# Patient Record
Sex: MALE | Race: WHITE | NOT HISPANIC OR LATINO | URBAN - METROPOLITAN AREA
[De-identification: names, ages, dates, MRNs, and addresses within clinical notes are randomized per-mention and may not be internally consistent; named-entity substitution may affect disease eponyms.]

---

## 2022-07-14 ENCOUNTER — NEW PATIENT (OUTPATIENT)
Dept: URBAN - METROPOLITAN AREA CLINIC 14 | Facility: CLINIC | Age: 77
End: 2022-07-14

## 2022-07-14 VITALS — HEIGHT: 60 IN | SYSTOLIC BLOOD PRESSURE: 150 MMHG | DIASTOLIC BLOOD PRESSURE: 80 MMHG | HEART RATE: 75 BPM

## 2022-07-14 DIAGNOSIS — H25.13: ICD-10-CM

## 2022-07-14 PROCEDURE — 92136 OPHTHALMIC BIOMETRY: CPT

## 2022-07-14 PROCEDURE — 99204 OFFICE O/P NEW MOD 45 MIN: CPT

## 2022-07-14 PROCEDURE — 92015 DETERMINE REFRACTIVE STATE: CPT

## 2022-07-14 ASSESSMENT — TONOMETRY
OS_IOP_MMHG: 14
OD_IOP_MMHG: 13

## 2022-07-14 ASSESSMENT — VISUAL ACUITY
OS_SC: 20/100
OD_CC: 20/30
OS_BCVA: 20/70
OU_SC: 20/70
OU_CC: 20/30
OD_SC: 20/70
OD_BCVA: 20/30
OS_CC: 20/70

## 2022-07-14 ASSESSMENT — KERATOMETRY
OS_AXISANGLE2_DEGREES: 175
OD_K2POWER_DIOPTERS: 45.75
OD_AXISANGLE2_DEGREES: 12
OS_AXISANGLE_DEGREES: 85
OD_AXISANGLE_DEGREES: 102
OS_K2POWER_DIOPTERS: 46.00
OD_K1POWER_DIOPTERS: 44.25
OS_K1POWER_DIOPTERS: 44.50

## 2022-08-30 ENCOUNTER — CLINICAL TRIAL VISIT (OUTPATIENT)
Dept: URBAN - METROPOLITAN AREA CLINIC 14 | Facility: CLINIC | Age: 77
End: 2022-08-30

## 2022-08-30 DIAGNOSIS — H25.13: ICD-10-CM

## 2022-08-30 PROCEDURE — 99199CT CLINICAL TRIAL VISIT

## 2022-09-14 ENCOUNTER — POST-OP (OUTPATIENT)
Dept: URBAN - METROPOLITAN AREA CLINIC 14 | Facility: CLINIC | Age: 77
End: 2022-09-14

## 2022-09-14 DIAGNOSIS — H25.11: ICD-10-CM

## 2022-09-14 DIAGNOSIS — Z96.1: ICD-10-CM

## 2022-09-14 PROCEDURE — 99024 POSTOP FOLLOW-UP VISIT: CPT

## 2022-09-14 PROCEDURE — 92136 OPHTHALMIC BIOMETRY: CPT

## 2022-09-14 ASSESSMENT — KERATOMETRY
OS_AXISANGLE2_DEGREES: 175
OS_AXISANGLE_DEGREES: 85
OS_K2POWER_DIOPTERS: 46.00
OD_AXISANGLE_DEGREES: 102
OD_AXISANGLE2_DEGREES: 12
OS_K1POWER_DIOPTERS: 44.50
OD_K1POWER_DIOPTERS: 44.25
OD_K2POWER_DIOPTERS: 45.75

## 2022-09-14 ASSESSMENT — VISUAL ACUITY
OD_BCVA: 20/30
OS_SC: 20/30

## 2022-09-14 ASSESSMENT — TONOMETRY
OD_IOP_MMHG: 15
OS_IOP_MMHG: 18

## 2022-09-21 ENCOUNTER — POST-OP (OUTPATIENT)
Dept: URBAN - METROPOLITAN AREA CLINIC 14 | Facility: CLINIC | Age: 77
End: 2022-09-21

## 2022-09-21 DIAGNOSIS — Z96.1: ICD-10-CM

## 2022-09-21 PROCEDURE — 99024 POSTOP FOLLOW-UP VISIT: CPT

## 2022-09-21 ASSESSMENT — KERATOMETRY
OS_AXISANGLE_DEGREES: 85
OS_K2POWER_DIOPTERS: 46.00
OD_AXISANGLE2_DEGREES: 12
OD_K2POWER_DIOPTERS: 45.75
OS_K1POWER_DIOPTERS: 44.50
OD_AXISANGLE_DEGREES: 102
OD_K1POWER_DIOPTERS: 44.25
OS_AXISANGLE2_DEGREES: 175

## 2022-09-21 ASSESSMENT — TONOMETRY
OD_IOP_MMHG: 15
OS_IOP_MMHG: 11

## 2022-09-21 ASSESSMENT — VISUAL ACUITY: OD_SC: 20/25

## 2022-10-20 ENCOUNTER — POST-OP (OUTPATIENT)
Dept: URBAN - METROPOLITAN AREA CLINIC 14 | Facility: CLINIC | Age: 77
End: 2022-10-20

## 2022-10-20 DIAGNOSIS — Z96.1: ICD-10-CM

## 2022-10-20 PROCEDURE — 99024 POSTOP FOLLOW-UP VISIT: CPT

## 2022-10-20 ASSESSMENT — TONOMETRY
OD_IOP_MMHG: 14
OS_IOP_MMHG: 13

## 2022-10-20 ASSESSMENT — VISUAL ACUITY
OS_BCVA: 20/25
OD_SC: 20/40
OD_BCVA: 20/30
OS_SC: 20/40

## 2022-11-14 NOTE — PATIENT DISCUSSION
Discussed condition and exacerbating conditions/situations (e.g., dry/arid environments, overhead fans, air conditioners, side effect of medications). Patient to use artificial tears for dryness.